# Patient Record
Sex: FEMALE | Race: BLACK OR AFRICAN AMERICAN | NOT HISPANIC OR LATINO | Employment: OTHER | URBAN - METROPOLITAN AREA
[De-identification: names, ages, dates, MRNs, and addresses within clinical notes are randomized per-mention and may not be internally consistent; named-entity substitution may affect disease eponyms.]

---

## 2019-10-11 PROBLEM — H52.203 MYOPIA OF BOTH EYES WITH ASTIGMATISM: Status: ACTIVE | Noted: 2019-10-11

## 2019-10-11 PROBLEM — H50.15 ALTERNATING EXOTROPIA: Status: ACTIVE | Noted: 2019-10-11

## 2019-10-11 PROBLEM — H52.13 MYOPIA OF BOTH EYES WITH ASTIGMATISM: Status: ACTIVE | Noted: 2019-10-11

## 2019-12-23 PROBLEM — G82.20 PARAPLEGIA: Status: ACTIVE | Noted: 2019-12-23

## 2020-01-27 PROBLEM — I95.89 CHRONIC HYPOTENSION: Status: ACTIVE | Noted: 2020-01-27

## 2020-01-27 PROBLEM — G82.20 PARAPLEGIA: Status: ACTIVE | Noted: 2020-01-27

## 2020-01-27 PROBLEM — M25.552 LEFT HIP PAIN: Status: ACTIVE | Noted: 2020-01-27

## 2020-01-27 PROBLEM — S22.059A: Status: ACTIVE | Noted: 2019-11-05

## 2020-01-27 PROBLEM — N39.0 CHRONIC UTI: Status: ACTIVE | Noted: 2020-01-27

## 2020-01-27 PROBLEM — S12.600A C7 CERVICAL FRACTURE: Status: ACTIVE | Noted: 2019-11-05

## 2020-01-27 PROBLEM — F32.A ANXIETY AND DEPRESSION: Status: ACTIVE | Noted: 2020-01-27

## 2020-01-27 PROBLEM — Z99.3 WHEELCHAIR BOUND: Status: ACTIVE | Noted: 2020-01-27

## 2020-01-27 PROBLEM — F41.9 ANXIETY AND DEPRESSION: Status: ACTIVE | Noted: 2020-01-27

## 2020-01-27 PROBLEM — R10.32 ABDOMINAL DISCOMFORT IN LEFT LOWER QUADRANT: Status: ACTIVE | Noted: 2019-12-01

## 2020-01-27 PROBLEM — S14.109A: Status: ACTIVE | Noted: 2019-11-05

## 2020-01-27 PROBLEM — M25.512 LEFT SHOULDER PAIN: Status: ACTIVE | Noted: 2019-11-05

## 2020-01-27 PROBLEM — S42.102A CLOSED LEFT SCAPULAR FRACTURE: Status: ACTIVE | Noted: 2019-11-11

## 2020-01-27 PROBLEM — R68.83 CHILLS: Status: ACTIVE | Noted: 2020-01-27

## 2020-01-27 PROBLEM — N12 PYELONEPHRITIS: Status: ACTIVE | Noted: 2020-01-27

## 2020-01-27 PROBLEM — D50.0 IRON DEFICIENCY ANEMIA DUE TO CHRONIC BLOOD LOSS: Status: ACTIVE | Noted: 2020-01-27

## 2020-01-27 PROBLEM — N30.00 ACUTE CYSTITIS WITHOUT HEMATURIA: Status: ACTIVE | Noted: 2020-01-27

## 2020-01-28 PROBLEM — N31.9 NEUROGENIC BLADDER: Status: ACTIVE | Noted: 2020-01-28

## 2020-01-31 PROBLEM — R68.83 CHILLS: Status: RESOLVED | Noted: 2020-01-27 | Resolved: 2020-01-31

## 2020-02-27 PROBLEM — B96.89 URINARY TRACT INFECTION DUE TO EXTENDED-SPECTRUM BETA LACTAMASE (ESBL)-PRODUCING KLEBSIELLA: Status: ACTIVE | Noted: 2020-02-27

## 2020-02-27 PROBLEM — N39.0 URINARY TRACT INFECTION DUE TO EXTENDED-SPECTRUM BETA LACTAMASE (ESBL)-PRODUCING KLEBSIELLA: Status: ACTIVE | Noted: 2020-02-27

## 2020-02-28 PROBLEM — N12 PYELONEPHRITIS: Status: RESOLVED | Noted: 2020-01-27 | Resolved: 2020-02-28

## 2020-05-12 PROBLEM — N63.20 BREAST MASS, LEFT: Status: ACTIVE | Noted: 2020-05-12

## 2020-10-13 PROBLEM — H01.029 SQUAMOUS BLEPHARITIS OF BOTH UPPER AND LOWER EYELID: Status: ACTIVE | Noted: 2020-10-13

## 2020-10-13 PROBLEM — H04.123 DRY EYE SYNDROME OF BOTH EYES: Status: ACTIVE | Noted: 2020-10-13

## 2021-01-26 PROBLEM — M54.6 THORACIC BACK PAIN: Status: ACTIVE | Noted: 2021-01-26

## 2021-04-06 PROBLEM — N20.0 KIDNEY STONES: Status: ACTIVE | Noted: 2021-04-06

## 2021-09-21 ENCOUNTER — TELEPHONE (OUTPATIENT)
Dept: ADMINISTRATIVE | Facility: HOSPITAL | Age: 21
End: 2021-09-21

## 2021-11-11 PROBLEM — B37.9 YEAST INFECTION: Status: ACTIVE | Noted: 2021-11-11

## 2022-02-28 ENCOUNTER — EXTERNAL HOME HEALTH (OUTPATIENT)
Dept: HOME HEALTH SERVICES | Facility: HOSPITAL | Age: 22
End: 2022-02-28
Payer: MEDICAID

## 2022-03-08 ENCOUNTER — DOCUMENT SCAN (OUTPATIENT)
Dept: HOME HEALTH SERVICES | Facility: HOSPITAL | Age: 22
End: 2022-03-08
Payer: MEDICAID

## 2022-03-10 ENCOUNTER — DOCUMENT SCAN (OUTPATIENT)
Dept: HOME HEALTH SERVICES | Facility: HOSPITAL | Age: 22
End: 2022-03-10
Payer: MEDICAID

## 2022-03-23 ENCOUNTER — DOCUMENT SCAN (OUTPATIENT)
Dept: HOME HEALTH SERVICES | Facility: HOSPITAL | Age: 22
End: 2022-03-23
Payer: MEDICAID

## 2022-04-26 ENCOUNTER — EXTERNAL HOME HEALTH (OUTPATIENT)
Dept: HOME HEALTH SERVICES | Facility: HOSPITAL | Age: 22
End: 2022-04-26
Payer: MEDICAID

## 2023-03-28 ENCOUNTER — PATIENT MESSAGE (OUTPATIENT)
Dept: RESEARCH | Facility: HOSPITAL | Age: 23
End: 2023-03-28

## 2025-02-25 LAB
ALBUMIN SERPL-MCNC: 4.4 G/DL (ref 3.2–4.8)
ALP SERPL-CCNC: 48 U/L (ref 46–116)
ALT SERPL-CCNC: 10 U/L (ref 7–40)
ANION GAP SERPL CALCULATED.3IONS-SCNC: 12 MMOL/L (ref 5–15)
BILIRUB SERPL-MCNC: 0.2 MG/DL (ref 0.2–1)
BUN SERPL-MCNC: 10 MG/DL (ref 9–23)
BUN/CREAT SERPL: 14.7 (ref 10–20)
CALCIUM SERPL-MCNC: 10 MG/DL (ref 8.7–10.4)
CHLORIDE SERPL-SCNC: 103 MMOL/L (ref 98–107)
CO2 SERPL-SCNC: 24 MMOL/L (ref 20–31)
GLUCOSE SERPL-MCNC: 130 MG/DL (ref 74–106)
HCT VFR BLD AUTO: 33.4 % (ref 36–46)
HGB BLD-MCNC: 10.6 G/DL (ref 12.2–16.2)
MCH RBC QN AUTO: 25.1 PG (ref 28–32)
MCV RBC AUTO: 78.9 FL (ref 80–100)
NRBC BLD QL AUTO: 0.1 %
POTASSIUM SERPL-SCNC: 3.3 MMOL/L (ref 3.5–5.1)
PROT SERPL-MCNC: 6.9 G/DL (ref 5.7–8.2)
PROT UR STRIP.AUTO-MCNC: (no result) MG/DL
SODIUM SERPL-SCNC: 139 MMOL/L (ref 136–145)
WBC CLUMPS UR QL AUTO: PRESENT /HPF

## 2025-02-27 ENCOUNTER — HOSPITAL ENCOUNTER (OUTPATIENT)
Dept: HOSPITAL 15 - SUR | Age: 25
Discharge: HOME | End: 2025-02-27
Attending: UROLOGY
Payer: COMMERCIAL

## 2025-02-27 VITALS — WEIGHT: 120 LBS | HEIGHT: 66 IN | BODY MASS INDEX: 19.29 KG/M2

## 2025-02-27 VITALS
OXYGEN SATURATION: 97 % | SYSTOLIC BLOOD PRESSURE: 145 MMHG | RESPIRATION RATE: 15 BRPM | HEART RATE: 101 BPM | DIASTOLIC BLOOD PRESSURE: 85 MMHG

## 2025-02-27 VITALS — TEMPERATURE: 98 F

## 2025-02-27 DIAGNOSIS — N21.0: Primary | ICD-10-CM

## 2025-02-27 DIAGNOSIS — N31.9: ICD-10-CM

## 2025-02-27 DIAGNOSIS — G82.50: ICD-10-CM

## 2025-02-27 PROCEDURE — 81001 URINALYSIS AUTO W/SCOPE: CPT

## 2025-02-27 PROCEDURE — 88300 SURGICAL PATH GROSS: CPT

## 2025-02-27 PROCEDURE — 80053 COMPREHEN METABOLIC PANEL: CPT

## 2025-02-27 PROCEDURE — 84702 CHORIONIC GONADOTROPIN TEST: CPT

## 2025-02-27 PROCEDURE — 52318 REMOVE BLADDER STONE: CPT

## 2025-02-27 PROCEDURE — 87086 URINE CULTURE/COLONY COUNT: CPT

## 2025-02-27 PROCEDURE — 87186 SC STD MICRODIL/AGAR DIL: CPT

## 2025-02-27 PROCEDURE — 87088 URINE BACTERIA CULTURE: CPT

## 2025-02-27 PROCEDURE — 82360 CALCULUS ASSAY QUANT: CPT

## 2025-02-27 NOTE — DVHDS2
New Physician D'charge PN


Admitting Diagnosis


Admitting Diagnosis


Bladder calculi


Neurogenic bladder


Quadriplegia





Discharge Diagnosis





Same





Operations or Procedures


Cysto litholapaxy





Reason(s) For Hospitalization


Surgery





Treatment Plan


Discharge





Condition of Discharge


Fair





Disposition


Home





Discharge Instructions


Diet:  Regular


Activity:  Light activity


Activity comment:  


As tolerated


Medications:  


Given





Follow Up Care


Follow Up/Referral:  


Arce catheter removal on postop day 1.


Discharge Statement:


"Patient was advised to return to the ER or call 911 if any headaches, 

dizziness, shortness of breath, chest pain, abdominal pain, bleeding, fevers, or

worsening of medical condition.





Patient was counseled about treatment plan, medications, possible side effects, 

patientverbalized understanding. All questions were answered to the best of my 

ability.





This discharge took greater then 30 minutes in planning, reviewing docum

entation, counseling the patient, and discussing with other team members."











DENA ENGLAND MD                Feb 27, 2025 11:03
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
## 2025-03-14 ENCOUNTER — HOSPITAL ENCOUNTER (OUTPATIENT)
Dept: HOSPITAL 15 - LAB | Age: 25
Discharge: HOME | End: 2025-03-14
Attending: UROLOGY
Payer: COMMERCIAL

## 2025-03-14 DIAGNOSIS — N39.0: Primary | ICD-10-CM

## 2025-03-14 PROCEDURE — 87088 URINE BACTERIA CULTURE: CPT

## 2025-03-14 PROCEDURE — 87086 URINE CULTURE/COLONY COUNT: CPT

## 2025-03-14 PROCEDURE — 87186 SC STD MICRODIL/AGAR DIL: CPT

## 2025-03-14 PROCEDURE — 81001 URINALYSIS AUTO W/SCOPE: CPT
